# Patient Record
Sex: FEMALE | Race: WHITE | NOT HISPANIC OR LATINO | URBAN - METROPOLITAN AREA
[De-identification: names, ages, dates, MRNs, and addresses within clinical notes are randomized per-mention and may not be internally consistent; named-entity substitution may affect disease eponyms.]

---

## 2020-12-23 VITALS
SYSTOLIC BLOOD PRESSURE: 105 MMHG | OXYGEN SATURATION: 94 % | HEART RATE: 82 BPM | DIASTOLIC BLOOD PRESSURE: 85 MMHG | DIASTOLIC BLOOD PRESSURE: 78 MMHG | HEART RATE: 84 BPM | HEART RATE: 83 BPM | OXYGEN SATURATION: 100 % | RESPIRATION RATE: 18 BRPM | SYSTOLIC BLOOD PRESSURE: 110 MMHG | SYSTOLIC BLOOD PRESSURE: 112 MMHG | SYSTOLIC BLOOD PRESSURE: 118 MMHG | HEART RATE: 89 BPM | SYSTOLIC BLOOD PRESSURE: 125 MMHG | SYSTOLIC BLOOD PRESSURE: 164 MMHG | DIASTOLIC BLOOD PRESSURE: 83 MMHG | RESPIRATION RATE: 21 BRPM | DIASTOLIC BLOOD PRESSURE: 75 MMHG | RESPIRATION RATE: 25 BRPM | SYSTOLIC BLOOD PRESSURE: 113 MMHG | OXYGEN SATURATION: 99 % | OXYGEN SATURATION: 98 % | HEART RATE: 74 BPM | RESPIRATION RATE: 26 BRPM | RESPIRATION RATE: 22 BRPM | HEART RATE: 90 BPM | WEIGHT: 260 LBS | HEIGHT: 67 IN | DIASTOLIC BLOOD PRESSURE: 71 MMHG | DIASTOLIC BLOOD PRESSURE: 77 MMHG | RESPIRATION RATE: 23 BRPM | HEART RATE: 81 BPM | HEART RATE: 77 BPM | HEART RATE: 85 BPM | DIASTOLIC BLOOD PRESSURE: 86 MMHG | RESPIRATION RATE: 19 BRPM | SYSTOLIC BLOOD PRESSURE: 134 MMHG | RESPIRATION RATE: 24 BRPM | TEMPERATURE: 97.2 F | HEART RATE: 86 BPM | DIASTOLIC BLOOD PRESSURE: 84 MMHG | SYSTOLIC BLOOD PRESSURE: 146 MMHG | TEMPERATURE: 97.3 F | SYSTOLIC BLOOD PRESSURE: 126 MMHG | SYSTOLIC BLOOD PRESSURE: 124 MMHG | HEART RATE: 80 BPM

## 2020-12-28 ENCOUNTER — AMBULATORY SURGICAL CENTER (AMBULATORY)
Dept: URBAN - METROPOLITAN AREA SURGERY 17 | Facility: SURGERY | Age: 65
End: 2020-12-28
Payer: COMMERCIAL

## 2020-12-28 DIAGNOSIS — Z86.010 PERSONAL HISTORY OF COLONIC POLYPS: ICD-10-CM

## 2020-12-28 DIAGNOSIS — K63.89 OTHER SPECIFIED DISEASES OF INTESTINE: ICD-10-CM

## 2020-12-28 DIAGNOSIS — K57.30 DIVERTICULOSIS OF LARGE INTESTINE WITHOUT PERFORATION OR ABS: ICD-10-CM

## 2020-12-28 PROBLEM — Z12.11 SCREENING FOR COLONIC NEOPLASIA: Status: ACTIVE | Noted: 2020-12-28

## 2020-12-28 PROCEDURE — 45378 DIAGNOSTIC COLONOSCOPY: CPT | Mod: 33 | Performed by: INTERNAL MEDICINE

## 2020-12-28 NOTE — SERVICEHPINOTES
female without GI complaints. Her mom had colon cancer but in her 70s so that does not increase the patients risk. She has however had polyps in the past. She presents for a F/U colonoscopy.

## 2021-10-14 ENCOUNTER — OFFICE VISIT (OUTPATIENT)
Dept: NEUROSURGERY | Facility: CLINIC | Age: 66
End: 2021-10-14

## 2021-10-14 VITALS — HEIGHT: 64 IN | WEIGHT: 267.4 LBS | BODY MASS INDEX: 45.65 KG/M2

## 2021-10-14 DIAGNOSIS — M47.817 SPONDYLOSIS OF LUMBOSACRAL REGION WITHOUT MYELOPATHY OR RADICULOPATHY: Primary | ICD-10-CM

## 2021-10-14 DIAGNOSIS — M51.36 DEGENERATIVE DISC DISEASE, LUMBAR: ICD-10-CM

## 2021-10-14 PROCEDURE — 99204 OFFICE O/P NEW MOD 45 MIN: CPT | Performed by: NURSE PRACTITIONER

## 2021-10-14 RX ORDER — LEVOTHYROXINE AND LIOTHYRONINE 38; 9 UG/1; UG/1
60 TABLET ORAL DAILY
COMMUNITY

## 2021-10-14 RX ORDER — CLOPIDOGREL BISULFATE 75 MG/1
75 TABLET ORAL DAILY
COMMUNITY

## 2021-10-14 RX ORDER — TRIAMTERENE AND HYDROCHLOROTHIAZIDE 37.5; 25 MG/1; MG/1
1 CAPSULE ORAL EVERY MORNING
COMMUNITY

## 2021-10-14 RX ORDER — BUMETANIDE 1 MG/1
1 TABLET ORAL DAILY
COMMUNITY

## 2021-10-14 RX ORDER — TIZANIDINE 4 MG/1
4 TABLET ORAL NIGHTLY PRN
COMMUNITY

## 2021-10-14 RX ORDER — MONTELUKAST SODIUM 10 MG/1
10 TABLET ORAL NIGHTLY
COMMUNITY

## 2021-10-14 RX ORDER — MULTIVIT WITH MINERALS/LUTEIN
1000 TABLET ORAL DAILY
COMMUNITY

## 2021-10-14 RX ORDER — OMEPRAZOLE 40 MG/1
40 CAPSULE, DELAYED RELEASE ORAL DAILY
COMMUNITY

## 2021-10-14 RX ORDER — CHOLECALCIFEROL (VITAMIN D3) 125 MCG
500 CAPSULE ORAL DAILY
COMMUNITY

## 2021-10-14 NOTE — PROGRESS NOTES
"Chief Complaint  Back Pain    Subjective     {Problem List  Visit Diagnosis   Encounters  Notes  Medications  Labs  Result Review Imaging  Media :23}     Lori Aguero who is a 65 y.o. year old female who presents to [facility] for Evaluation of the Spine.     The patient complains of pain located in the {Spine Pain Location:96625} Spine.  Patients states the pain has been present for {Numbers; 0-30:39862} {days/weeks:52166}.  The pain came on {acutely/gradually:15368}.  The pain scaled level is {0-10:79678}.  The pain {Dermatomes :14665}.  The pain is {Constant/Intermittent/Waxing/Wanin} and described as {Pain description:37001}.  The pain is worse {time of day:63353}. Patient states {What makes the the pain better?:19624}.  Patient states {What makes the pain worse?:79317}.    Associated Symptoms Include: {Associated Symptoms (Spine):67694}  Conservative Interventions Include: {Intervention Methods:68192}    {Was this the result of an injury or accident? (Optional):62031}    History of Previous Spinal Surgery?: {Yes/No Spinal Surgery:93524}     reports that she has quit smoking. She has never used smokeless tobacco.    Review of Systems   Cardiovascular: Positive for leg swelling.   Musculoskeletal: Positive for back pain and joint swelling.   Allergic/Immunologic: Positive for environmental allergies and food allergies.   Hematological: Bruises/bleeds easily.        Objective   Vital Signs:   Ht 162.6 cm (64\")   Wt 121 kg (267 lb 6.4 oz)   BMI 45.90 kg/m²       Physical Exam   Neurologic Exam     Result Review :{Labs  Result Review  Imaging  Med Tab  Media  Procedures :23}   {The following data was reviewed by (Optional):16873}  {Results Reviewed (Brain) (Optional):75393}       Assessment and Plan {CC Problem List  Visit Diagnosis   ROS  Review (Popup)  [facility] Maintenance  Quality  BestPractice  Medications  SmartSets  SnapShot Encounters "  Media :23}   There are no diagnoses linked to this encounter.    {Time Spent (Optional):08205}  Follow Up {Instructions Charge Capture  Follow-up Communications :23}  No follow-ups on file.  Patient was given instructions and counseling regarding her condition or for health maintenance advice. Please see specific information pulled into the AVS if appropriate.

## 2021-10-14 NOTE — PROGRESS NOTES
"Chief Complaint  Back Pain    Subjective          Lori Aguero who is a 65 y.o. year old female who presents to [facility] NEUROLOG[facility] NEUROSURGERY for evaluation of her low back pain.     Pt with concerns of chronic mid and low back pain dating back to many years. Pain is primarily in the back itself, with stiffness, aching, and soreness. Limited ROM. Prolonged standing and walking makes her pain worse. She will have aching pain into the legs when walking, stopping at the thighs. She was taking NSAIDs for many years which provided benefit. No longer able to take this due to heart medications she is taking. She has been followed by pain management, receiving LESB which provided good relief. She has had nerve blocks in the back which did not help. She has not had any recent physical therapy.       MRI Lumbar Spine at [facility] on 8/14/21 personally reviewed. Multilevel degenerative changes with DDD similar to prior study on 6/27/2020. Mild canal stenosis at several levels without high grade canal or foraminal stenosis.        Recent Interventions: LESB      Review of Systems   Musculoskeletal: Positive for arthralgias, back pain and gait problem.   All other systems reviewed and are negative.       Objective   Vital Signs:   Ht 162.6 cm (64\")   Wt 121 kg (267 lb 6.4 oz)   BMI 45.90 kg/m²       Physical Exam  Vitals reviewed.   Constitutional:       Appearance: Normal appearance.   Musculoskeletal:      Lumbar back: Tenderness present. Negative right straight leg raise test and negative left straight leg raise test.   Neurological:      Mental Status: She is alert and oriented to person, place, and time.      Deep Tendon Reflexes:      Reflex Scores:       Patellar reflexes are 1+ on the right side and 1+ on the left side.       Achilles reflexes are 1+ on the right side and 1+ on the left side.       Neurologic Exam     Mental Status   Oriented to person, place, and time.   Level of " consciousness: alert    Motor Exam   Muscle bulk: normal  Overall muscle tone: normal    Sensory Exam   Light touch normal.     Gait, Coordination, and Reflexes     Gait  Gait: wide-based    Reflexes   Right patellar: 1+  Left patellar: 1+  Right achilles: 1+  Left achilles: 1+       Result Review :       Data reviewed: Radiologic studies MRI Lumbar Spine at [facility] on 8/14/21 personally reviewed. Multilevel degenerative changes with DDD similar to prior study on 6/27/2020. Mild canal stenosis at several levels without high grade canal or foraminal stenosis.          Assessment and Plan    Diagnoses and all orders for this visit:    1. Spondylosis of lumbosacral region without myelopathy or radiculopathy (Primary)    2. Degenerative disc disease, lumbar    Pt with chronic low back pain. We reviewed her MRI Lumbar Spine. No surgical recommendations at this time. We discussed the benefits of core strengthening, light exercise, routine stretching, and weight management for chronic back pain. Discussed a referral to physical therapy. She declines this today. LESB have been helpful in the past and she can discuss this with pain management. She will follow up in our office on an as needed basis.     I spent 35 minutes caring for Lori on this date of service. This time includes time spent by me in the following activities:preparing for the visit, reviewing tests, obtaining and/or reviewing a separately obtained history, performing a medically appropriate examination and/or evaluation , counseling and educating the patient/family/caregiver, documenting information in the medical record and independently interpreting results and communicating that information with the patient/family/caregiver.    Follow Up   Return if symptoms worsen or fail to improve.  Patient was given instructions and counseling regarding her condition or for health maintenance advice.     -Follow up as needed